# Patient Record
Sex: FEMALE | Race: OTHER | HISPANIC OR LATINO | URBAN - METROPOLITAN AREA
[De-identification: names, ages, dates, MRNs, and addresses within clinical notes are randomized per-mention and may not be internally consistent; named-entity substitution may affect disease eponyms.]

---

## 2023-02-12 ENCOUNTER — EMERGENCY (EMERGENCY)
Facility: HOSPITAL | Age: 33
LOS: 1 days | Discharge: HOME | End: 2023-02-12
Attending: EMERGENCY MEDICINE
Payer: MEDICAID

## 2023-02-12 VITALS
TEMPERATURE: 99 F | HEART RATE: 96 BPM | SYSTOLIC BLOOD PRESSURE: 125 MMHG | RESPIRATION RATE: 16 BRPM | DIASTOLIC BLOOD PRESSURE: 88 MMHG | OXYGEN SATURATION: 100 %

## 2023-02-12 VITALS
TEMPERATURE: 98 F | SYSTOLIC BLOOD PRESSURE: 128 MMHG | OXYGEN SATURATION: 100 % | HEART RATE: 137 BPM | RESPIRATION RATE: 18 BRPM | DIASTOLIC BLOOD PRESSURE: 81 MMHG | WEIGHT: 156.97 LBS

## 2023-02-12 DIAGNOSIS — Z98.891 HISTORY OF UTERINE SCAR FROM PREVIOUS SURGERY: Chronic | ICD-10-CM

## 2023-02-12 LAB
ALBUMIN SERPL ELPH-MCNC: 3.6 G/DL — SIGNIFICANT CHANGE UP (ref 3.5–5)
ALP SERPL-CCNC: 68 U/L — SIGNIFICANT CHANGE UP (ref 40–120)
ALT FLD-CCNC: 19 U/L DA — SIGNIFICANT CHANGE UP (ref 10–60)
ANION GAP SERPL CALC-SCNC: 8 MMOL/L — SIGNIFICANT CHANGE UP (ref 5–17)
APPEARANCE UR: CLEAR — SIGNIFICANT CHANGE UP
AST SERPL-CCNC: 14 U/L — SIGNIFICANT CHANGE UP (ref 10–40)
BACTERIA # UR AUTO: ABNORMAL /HPF
BASOPHILS # BLD AUTO: 0.03 K/UL — SIGNIFICANT CHANGE UP (ref 0–0.2)
BASOPHILS NFR BLD AUTO: 0.3 % — SIGNIFICANT CHANGE UP (ref 0–2)
BILIRUB SERPL-MCNC: 0.1 MG/DL — LOW (ref 0.2–1.2)
BILIRUB UR-MCNC: NEGATIVE — SIGNIFICANT CHANGE UP
BLD GP AB SCN SERPL QL: SIGNIFICANT CHANGE UP
BUN SERPL-MCNC: 8 MG/DL — SIGNIFICANT CHANGE UP (ref 7–18)
CALCIUM SERPL-MCNC: 9.5 MG/DL — SIGNIFICANT CHANGE UP (ref 8.4–10.5)
CHLORIDE SERPL-SCNC: 106 MMOL/L — SIGNIFICANT CHANGE UP (ref 96–108)
CO2 SERPL-SCNC: 22 MMOL/L — SIGNIFICANT CHANGE UP (ref 22–31)
COD CRY URNS QL: ABNORMAL /HPF
COLOR SPEC: YELLOW — SIGNIFICANT CHANGE UP
CREAT SERPL-MCNC: 0.84 MG/DL — SIGNIFICANT CHANGE UP (ref 0.5–1.3)
DIFF PNL FLD: ABNORMAL
EGFR: 95 ML/MIN/1.73M2 — SIGNIFICANT CHANGE UP
EOSINOPHIL # BLD AUTO: 0.03 K/UL — SIGNIFICANT CHANGE UP (ref 0–0.5)
EOSINOPHIL NFR BLD AUTO: 0.3 % — SIGNIFICANT CHANGE UP (ref 0–6)
EPI CELLS # UR: ABNORMAL /HPF
FLUAV AG NPH QL: SIGNIFICANT CHANGE UP
FLUBV AG NPH QL: SIGNIFICANT CHANGE UP
GLUCOSE SERPL-MCNC: 125 MG/DL — HIGH (ref 70–99)
GLUCOSE UR QL: NEGATIVE — SIGNIFICANT CHANGE UP
HCG SERPL-ACNC: HIGH MIU/ML
HCT VFR BLD CALC: 39.4 % — SIGNIFICANT CHANGE UP (ref 34.5–45)
HGB BLD-MCNC: 13.6 G/DL — SIGNIFICANT CHANGE UP (ref 11.5–15.5)
IMM GRANULOCYTES NFR BLD AUTO: 0.3 % — SIGNIFICANT CHANGE UP (ref 0–0.9)
KETONES UR-MCNC: NEGATIVE — SIGNIFICANT CHANGE UP
LEUKOCYTE ESTERASE UR-ACNC: ABNORMAL
LIDOCAIN IGE QN: 149 U/L — SIGNIFICANT CHANGE UP (ref 73–393)
LYMPHOCYTES # BLD AUTO: 1.54 K/UL — SIGNIFICANT CHANGE UP (ref 1–3.3)
LYMPHOCYTES # BLD AUTO: 14.7 % — SIGNIFICANT CHANGE UP (ref 13–44)
MCHC RBC-ENTMCNC: 28.9 PG — SIGNIFICANT CHANGE UP (ref 27–34)
MCHC RBC-ENTMCNC: 34.5 GM/DL — SIGNIFICANT CHANGE UP (ref 32–36)
MCV RBC AUTO: 83.8 FL — SIGNIFICANT CHANGE UP (ref 80–100)
MONOCYTES # BLD AUTO: 0.51 K/UL — SIGNIFICANT CHANGE UP (ref 0–0.9)
MONOCYTES NFR BLD AUTO: 4.9 % — SIGNIFICANT CHANGE UP (ref 2–14)
NEUTROPHILS # BLD AUTO: 8.35 K/UL — HIGH (ref 1.8–7.4)
NEUTROPHILS NFR BLD AUTO: 79.5 % — HIGH (ref 43–77)
NITRITE UR-MCNC: NEGATIVE — SIGNIFICANT CHANGE UP
NRBC # BLD: 0 /100 WBCS — SIGNIFICANT CHANGE UP (ref 0–0)
PH UR: 8 — SIGNIFICANT CHANGE UP (ref 5–8)
PLATELET # BLD AUTO: 261 K/UL — SIGNIFICANT CHANGE UP (ref 150–400)
POTASSIUM SERPL-MCNC: 3.3 MMOL/L — LOW (ref 3.5–5.3)
POTASSIUM SERPL-SCNC: 3.3 MMOL/L — LOW (ref 3.5–5.3)
PROT SERPL-MCNC: 8 G/DL — SIGNIFICANT CHANGE UP (ref 6–8.3)
PROT UR-MCNC: 30 MG/DL
RBC # BLD: 4.7 M/UL — SIGNIFICANT CHANGE UP (ref 3.8–5.2)
RBC # FLD: 12.6 % — SIGNIFICANT CHANGE UP (ref 10.3–14.5)
RBC CASTS # UR COMP ASSIST: SIGNIFICANT CHANGE UP /HPF (ref 0–2)
SARS-COV-2 RNA SPEC QL NAA+PROBE: SIGNIFICANT CHANGE UP
SODIUM SERPL-SCNC: 136 MMOL/L — SIGNIFICANT CHANGE UP (ref 135–145)
SP GR SPEC: 1.01 — SIGNIFICANT CHANGE UP (ref 1.01–1.02)
UROBILINOGEN FLD QL: NEGATIVE — SIGNIFICANT CHANGE UP
WBC # BLD: 10.49 K/UL — SIGNIFICANT CHANGE UP (ref 3.8–10.5)
WBC # FLD AUTO: 10.49 K/UL — SIGNIFICANT CHANGE UP (ref 3.8–10.5)
WBC UR QL: SIGNIFICANT CHANGE UP /HPF (ref 0–5)

## 2023-02-12 PROCEDURE — 76801 OB US < 14 WKS SINGLE FETUS: CPT | Mod: 26

## 2023-02-12 PROCEDURE — 86901 BLOOD TYPING SEROLOGIC RH(D): CPT

## 2023-02-12 PROCEDURE — 99285 EMERGENCY DEPT VISIT HI MDM: CPT

## 2023-02-12 PROCEDURE — 76817 TRANSVAGINAL US OBSTETRIC: CPT

## 2023-02-12 PROCEDURE — 99285 EMERGENCY DEPT VISIT HI MDM: CPT | Mod: 25

## 2023-02-12 PROCEDURE — 86850 RBC ANTIBODY SCREEN: CPT

## 2023-02-12 PROCEDURE — 80053 COMPREHEN METABOLIC PANEL: CPT

## 2023-02-12 PROCEDURE — 76801 OB US < 14 WKS SINGLE FETUS: CPT

## 2023-02-12 PROCEDURE — 93005 ELECTROCARDIOGRAM TRACING: CPT

## 2023-02-12 PROCEDURE — 83690 ASSAY OF LIPASE: CPT

## 2023-02-12 PROCEDURE — 84702 CHORIONIC GONADOTROPIN TEST: CPT

## 2023-02-12 PROCEDURE — 81001 URINALYSIS AUTO W/SCOPE: CPT

## 2023-02-12 PROCEDURE — 96360 HYDRATION IV INFUSION INIT: CPT

## 2023-02-12 PROCEDURE — 36415 COLL VENOUS BLD VENIPUNCTURE: CPT

## 2023-02-12 PROCEDURE — 76817 TRANSVAGINAL US OBSTETRIC: CPT | Mod: 26

## 2023-02-12 PROCEDURE — 86900 BLOOD TYPING SEROLOGIC ABO: CPT

## 2023-02-12 PROCEDURE — 85025 COMPLETE CBC W/AUTO DIFF WBC: CPT

## 2023-02-12 PROCEDURE — 87637 SARSCOV2&INF A&B&RSV AMP PRB: CPT

## 2023-02-12 PROCEDURE — 87086 URINE CULTURE/COLONY COUNT: CPT

## 2023-02-12 RX ORDER — POTASSIUM CHLORIDE 20 MEQ
40 PACKET (EA) ORAL ONCE
Refills: 0 | Status: COMPLETED | OUTPATIENT
Start: 2023-02-12 | End: 2023-02-12

## 2023-02-12 RX ORDER — SODIUM CHLORIDE 9 MG/ML
1000 INJECTION INTRAMUSCULAR; INTRAVENOUS; SUBCUTANEOUS ONCE
Refills: 0 | Status: COMPLETED | OUTPATIENT
Start: 2023-02-12 | End: 2023-02-12

## 2023-02-12 RX ADMIN — SODIUM CHLORIDE 1000 MILLILITER(S): 9 INJECTION INTRAMUSCULAR; INTRAVENOUS; SUBCUTANEOUS at 16:43

## 2023-02-12 RX ADMIN — SODIUM CHLORIDE 1000 MILLILITER(S): 9 INJECTION INTRAMUSCULAR; INTRAVENOUS; SUBCUTANEOUS at 17:43

## 2023-02-12 RX ADMIN — Medication 40 MILLIEQUIVALENT(S): at 20:06

## 2023-02-12 NOTE — ED PROVIDER NOTE - CLINICAL SUMMARY MEDICAL DECISION MAKING FREE TEXT BOX
Patient is pregnant and with spotting, concern for threatened .  Will get labs, type and screen, sono, reassess.  Patient claims she is very nervous because she is worried about her pregnancy.

## 2023-02-12 NOTE — ED ADULT NURSE NOTE - OBJECTIVE STATEMENT
[id number]- [id number] pregnant presents with c/o lower abdominal pain for 2 days, denies dysuria, vaginal bleeding.

## 2023-02-12 NOTE — ED PROVIDER NOTE - CARE PLAN
1 Principal Discharge DX:	Threatened   Secondary Diagnosis:	Hypokalemia  Secondary Diagnosis:	Tachycardia

## 2023-02-12 NOTE — ED PROVIDER NOTE - NSFOLLOWUPINSTRUCTIONS_ED_ALL_ED_FT
Amenaza de aborto natural    LO QUE NECESITA SABER:    ¿Qué es ric amenaza de aborto natural?Ric amenaza de aborto ocurre cuando se tiene sangrado vaginal dentro de las primeras 20 semanas de embarazo. Eso indica que podría ocurrir un aborto natural. Ric amenaza de un aborto natural también se le conoce kristopher ric amenaza de pérdida del embarazo.    ¿Qué provoca un sangrado o manchado reggie el embarazo?Es posible que se desconozca la causa del sangrado o manchado. Las siguientes son las posibles causas de un sangrado vaginal reggie el embarazo:  •Pólipos, fibromas o quistes en el útero      •Relaciones sexuales      •Infección      •Dónde o cómo está sujeta la placenta al útero (matriz)      •Un problema con el feto (bebé que no ha nacido)      •Uso de drogas o alcohol      •Embarazo ectópico (el feto se desarrolla afuera del útero)      ¿Cuáles son los signos y síntomas de ric amenaza de aborto?  •Manchado o sangrado vaginal      •Dolor o cólicos en el abdomen o área lumbar      ¿Cómo se diagnostica ric amenaza de aborto?Informe a jones médico cuando el sangrado empezó. Es posible que usted necesite alguno de los siguientes:  •Los análisis de sangrepueden mostrar ric infección, revisar jones nivel hormonal de embarazo, o proporcionar información de jones axel en general.      •Un examen pélvicopara revisar el tamaño del útero. En un examen ginecológico también revisan la dilatación de jones maura uterino.      •Ric ecografía pélvicamuestra imágenes del feto y encuentra amandeep latidos cardíacos. También la ecografía muestra amandeep órganos reproductores y mantiene en observación la cantidad de sangrado.      ¿Cuál es el control de ric amenaza de aborto?Los siguientes podrían ayudar a controlar los síntomas y disminuir jones riesgo de un aborto natural:  •No se ponga nada dentro de jones vagina.No tenga relaciones sexuales, no tome duchas vaginales ni use tampones. Estas acciones pueden aumentar jones riesgo de ric infección o de un aborto natural.      •Repose según le indicaron.No practique ningún ejercicio ni actividades vigorosas. Estas actividades pueden causar un parto prematuro o un aborto natural. Pregunte a jones médico cuáles actividades físicas son las apropiadas para usted.      ¿Cuándo diomedes buscar atención inmediata?  •Se siente débil o que se desmaya.      •Tiene dolor o cólicos en el abdomen o en la espalda que empeoran.      •Tiene sangrado vaginal que en ric hora empapa por lo menos 1 toalla sanitaria.      •Le sale un material que parece tejido o coágulos grandes.      ¿Cuándo diomedes llamar a mi médico?  •Tiene fiebre.      •Tiene problemas para orinar, siente ardor o la necesidad de orinar con frecuencia.      •Tiene sangrado vaginal nuevo o que empeora.      •Tiene dolor o comezón vaginal o flujo vaginal de color amarillo o sallie o maloliente.      •Usted tiene preguntas o inquietudes acerca de jones condición o cuidado.      ACUERDOS SOBRE JONES CUIDADO:    Usted tiene el derecho de ayudar a planear jones cuidado. Aprenda todo lo que pueda sobre jones condición y kristopher darle tratamiento. Discuta amandeep opciones de tratamiento con amandeep médicos para decidir el cuidado que usted desea recibir. Usted siempre tiene el derecho de rechazar el tratamiento.           Hipopotasemia    Hypokalemia      Hipopotasemia significa que el nivel de potasio en kenneth es cruz que lo normal. El potasio es un mineral (electrolito) que ayuda a regular la cantidad de líquido en el organismo. También estimula la tensión (contracción) muscular y ayuda a que los nervios funcionen correctamente.    Normalmente, la mayor parte del potasio del organismo se encuentra dentro de las células y solo ric cantidad muy pequeña está en la kenneth. Debido a que la cantidad en la kenneth es tan pequeña, un cambio pequeño en los niveles de potasio en la kenneth puede ser potencialmente mortal.      ¿Cuáles son las causas?    Esta afección puede ser causada por lo siguiente:  •Antibióticos.      •Diarrea o vómitos. Jagdish demasiada cantidad de un medicamento que lo ayuda a tener deposiciones (laxante) puede causar diarrea y derivar en hipopotasemia.      •Enfermedad renal crónica (ERC).      •Medicamentos que ayudan al cuerpo a eliminar el exceso de líquido (diuréticos).      •Trastornos de la alimentación, kristopher anorexia o bulimia.      •Niveles bajos de magnesio en el organismo.      •Sudoración abundante.        ¿Cuáles son los signos o síntomas?    Los síntomas de esta afección incluyen:  •Debilidad.      •Estreñimiento.      •Fatiga.      •Calambres musculares.      •Confusión mental.      •Latidos cardíacos salteados o irregulares (palpitaciones).      •Hormigueo o entumecimiento.        ¿Cómo se diagnostica?    Esta afección se diagnostica con un análisis de kenneth.      ¿Cómo se trata?    El tratamiento para esta afección puede incluir lo siguiente:  •Consumo de suplementos de potasio.      •Ajuste de los medicamentos que catracho.      •Consumo de más alimentos que contengan ric gran cantidad de potasio.      Si amandeep niveles de potasio son muy bajos, posiblemente sea necesario que reciba potasio a través de ric vía intravenosa y se lo controle en el hospital.      Siga estas indicaciones en jones casa:      Comida y bebida   A plate with examples of foods in a healthy diet.   •Siga ric dieta saludable. Ric dieta saludable incluye frutas y verduras frescas, cereales integrales, grasas saludables y proteínas magras.    •Si se lo indican, coma más alimentos que contengan ric gran cantidad de potasio. Estos incluyen:  •Yasmany secos, kristopher cacahuetes y pistachos.      •Semillas, kristopher semillas de girasol y de calabaza.      •Porotos, guisantes secos y lentejas.      •Granos enteros y panes y cereales con salvado.      •Frutas y verduras frescas, kristopher damascos, palta, bananas, melón, kiwi, naranjas, tomates, espárragos y maddie.      •Jugos, kristopher naranja, tomate y ciruelas.      •Janay magras, incluido el pescado.      •Leche y productos lácteos, kristopher yogur.        Indicaciones generales     •Use los medicamentos de venta angela y los recetados solamente kristopher se lo haya indicado el médico. Dora incluye vitaminas, productos alimenticios naturales y suplementos.      •Concurra a todas las visitas de seguimiento. Dora es importante.        Comuníquese con un médico si:    •Tiene debilidad que empeora.      •Siente que el corazón late jeremy o está acelerado.      •Vomita.      •Tiene diarrea.      •Tiene diabetes y tiene problemas para mantener el nivel de azúcar en la kenneth dentro del rango indicado.        Solicite ayuda de inmediato si:    •Siente dolor en el pecho.      •Le falta el aire.      •Tiene vómitos o diarrea que crain más de 2 días.      •Se desmaya.      Estos síntomas pueden indicar ric emergencia. Solicite ayuda de inmediato. Llame al 911.   • No espere a sanjeev si los síntomas desaparecen.       • No conduzca por amandeep propios medios hasta el hospital.         Resumen    •Hipopotasemia significa que el nivel de potasio en kenneth es cruz que lo normal.      •Esta afección se diagnostica con un análisis de kenneth.      •La hipopotasemia puede tratarse tomando suplementos de potasio, ajustando los medicamentos que catracho o comiendo más alimentos con alto contenido de potasio.      •Si amandeep niveles de potasio son muy bajos, posiblemente sea necesario que reciba potasio a través de ric vía intravenosa y se lo controle en el hospital.      Esta información no tiene kristopher fin reemplazar el consejo del médico. Asegúrese de hacerle al médico cualquier pregunta que tenga.      No douching, intercourse till further instruction  follow up with your gyn

## 2023-02-12 NOTE — ED PROVIDER NOTE - OBJECTIVE STATEMENT
[id number] Caodaism, 32-year-old female -0-0-1, LMP 22.  Patient C/O on and off lower pelvic pain since yesterday radiated to her lower back.  Patient with spotting, did not change any pads, only noted blood when she cleans herself.  Denies dysuria, sexual intercourse prior to bleeding.  Describes pain 5/10, took nothing for pain.  Patient admits to vomiting daily

## 2023-02-12 NOTE — ED PROVIDER NOTE - PATIENT PORTAL LINK FT
You can access the FollowMyHealth Patient Portal offered by [facility] by registering at the following website: [email or web address]. By joining [employer]’s FollowMyHealth portal, you will also be able to view your health information using other applications (apps) compatible with our system.

## 2023-02-12 NOTE — ED PROVIDER NOTE - PROGRESS NOTE DETAILS
pt's labs/sono reports given/explained to pt.  Pt with IUP.  Advised to f/u with OB.  Pt states her HR is always elevated when she comes to hospital.  Pt did not sleep last night worrying after "her baby".  Advised to f/u with gyn

## 2023-02-13 LAB
CULTURE RESULTS: SIGNIFICANT CHANGE UP
SPECIMEN SOURCE: SIGNIFICANT CHANGE UP

## 2023-06-07 ENCOUNTER — OUTPATIENT (OUTPATIENT)
Dept: OUTPATIENT SERVICES | Facility: HOSPITAL | Age: 33
LOS: 1 days | End: 2023-06-07
Payer: MEDICAID

## 2023-06-07 DIAGNOSIS — Z98.891 HISTORY OF UTERINE SCAR FROM PREVIOUS SURGERY: Chronic | ICD-10-CM

## 2023-06-07 DIAGNOSIS — Z3A.00 WEEKS OF GESTATION OF PREGNANCY NOT SPECIFIED: ICD-10-CM

## 2023-06-07 DIAGNOSIS — O26.899 OTHER SPECIFIED PREGNANCY RELATED CONDITIONS, UNSPECIFIED TRIMESTER: ICD-10-CM

## 2023-06-07 PROBLEM — Z78.9 OTHER SPECIFIED HEALTH STATUS: Chronic | Status: ACTIVE | Noted: 2023-02-12

## 2023-06-07 LAB
APPEARANCE UR: CLEAR — SIGNIFICANT CHANGE UP
BILIRUB UR-MCNC: NEGATIVE — SIGNIFICANT CHANGE UP
COLOR SPEC: YELLOW — SIGNIFICANT CHANGE UP
DIFF PNL FLD: ABNORMAL
GLUCOSE UR QL: NEGATIVE — SIGNIFICANT CHANGE UP
KETONES UR-MCNC: NEGATIVE — SIGNIFICANT CHANGE UP
LEUKOCYTE ESTERASE UR-ACNC: ABNORMAL
NITRITE UR-MCNC: NEGATIVE — SIGNIFICANT CHANGE UP
PH UR: 7 — SIGNIFICANT CHANGE UP (ref 5–8)
PROT UR-MCNC: 15 MG/DL
SP GR SPEC: 1.01 — SIGNIFICANT CHANGE UP (ref 1.01–1.02)
UROBILINOGEN FLD QL: NEGATIVE — SIGNIFICANT CHANGE UP

## 2023-06-07 PROCEDURE — 76819 FETAL BIOPHYS PROFIL W/O NST: CPT

## 2023-06-07 PROCEDURE — G0463: CPT

## 2023-06-07 PROCEDURE — 87077 CULTURE AEROBIC IDENTIFY: CPT

## 2023-06-07 PROCEDURE — 87070 CULTURE OTHR SPECIMN AEROBIC: CPT

## 2023-06-07 PROCEDURE — 76817 TRANSVAGINAL US OBSTETRIC: CPT

## 2023-06-07 PROCEDURE — 76817 TRANSVAGINAL US OBSTETRIC: CPT | Mod: 26

## 2023-06-07 PROCEDURE — 81001 URINALYSIS AUTO W/SCOPE: CPT

## 2023-06-07 PROCEDURE — 76819 FETAL BIOPHYS PROFIL W/O NST: CPT | Mod: 26

## 2023-06-07 PROCEDURE — 59025 FETAL NON-STRESS TEST: CPT

## 2023-06-09 LAB
CULTURE RESULTS: SIGNIFICANT CHANGE UP
SPECIMEN SOURCE: SIGNIFICANT CHANGE UP

## 2023-08-08 ENCOUNTER — OUTPATIENT (OUTPATIENT)
Dept: OUTPATIENT SERVICES | Facility: HOSPITAL | Age: 33
LOS: 1 days | End: 2023-08-08

## 2023-08-08 DIAGNOSIS — Z3A.36 36 WEEKS GESTATION OF PREGNANCY: ICD-10-CM

## 2023-08-08 DIAGNOSIS — Z01.818 ENCOUNTER FOR OTHER PREPROCEDURAL EXAMINATION: ICD-10-CM

## 2023-08-08 DIAGNOSIS — Z98.891 HISTORY OF UTERINE SCAR FROM PREVIOUS SURGERY: Chronic | ICD-10-CM

## 2023-08-08 LAB
ANION GAP SERPL CALC-SCNC: 8 MMOL/L — SIGNIFICANT CHANGE UP (ref 5–17)
APTT BLD: 27.7 SEC — SIGNIFICANT CHANGE UP (ref 24.5–35.6)
BLD GP AB SCN SERPL QL: SIGNIFICANT CHANGE UP
BUN SERPL-MCNC: 4 MG/DL — LOW (ref 7–18)
CALCIUM SERPL-MCNC: 8.7 MG/DL — SIGNIFICANT CHANGE UP (ref 8.4–10.5)
CHLORIDE SERPL-SCNC: 112 MMOL/L — HIGH (ref 96–108)
CO2 SERPL-SCNC: 19 MMOL/L — LOW (ref 22–31)
CREAT SERPL-MCNC: 0.53 MG/DL — SIGNIFICANT CHANGE UP (ref 0.5–1.3)
EGFR: 126 ML/MIN/1.73M2 — SIGNIFICANT CHANGE UP
GLUCOSE SERPL-MCNC: 104 MG/DL — HIGH (ref 70–99)
HCT VFR BLD CALC: 35.2 % — SIGNIFICANT CHANGE UP (ref 34.5–45)
HGB BLD-MCNC: 11.8 G/DL — SIGNIFICANT CHANGE UP (ref 11.5–15.5)
INR BLD: 1.14 RATIO — SIGNIFICANT CHANGE UP (ref 0.85–1.18)
MCHC RBC-ENTMCNC: 29.4 PG — SIGNIFICANT CHANGE UP (ref 27–34)
MCHC RBC-ENTMCNC: 33.5 GM/DL — SIGNIFICANT CHANGE UP (ref 32–36)
MCV RBC AUTO: 87.6 FL — SIGNIFICANT CHANGE UP (ref 80–100)
NRBC # BLD: 0 /100 WBCS — SIGNIFICANT CHANGE UP (ref 0–0)
PLATELET # BLD AUTO: 171 K/UL — SIGNIFICANT CHANGE UP (ref 150–400)
POTASSIUM SERPL-MCNC: 3.4 MMOL/L — LOW (ref 3.5–5.3)
POTASSIUM SERPL-SCNC: 3.4 MMOL/L — LOW (ref 3.5–5.3)
PROTHROM AB SERPL-ACNC: 12.9 SEC — SIGNIFICANT CHANGE UP (ref 9.5–13)
RBC # BLD: 4.02 M/UL — SIGNIFICANT CHANGE UP (ref 3.8–5.2)
RBC # FLD: 14 % — SIGNIFICANT CHANGE UP (ref 10.3–14.5)
SODIUM SERPL-SCNC: 139 MMOL/L — SIGNIFICANT CHANGE UP (ref 135–145)
WBC # BLD: 9.6 K/UL — SIGNIFICANT CHANGE UP (ref 3.8–10.5)
WBC # FLD AUTO: 9.6 K/UL — SIGNIFICANT CHANGE UP (ref 3.8–10.5)

## 2023-08-09 LAB — T PALLIDUM AB TITR SER: NEGATIVE — SIGNIFICANT CHANGE UP

## 2023-08-11 ENCOUNTER — INPATIENT (INPATIENT)
Facility: HOSPITAL | Age: 33
LOS: 2 days | Discharge: HOME | End: 2023-08-14
Attending: OBSTETRICS & GYNECOLOGY | Admitting: OBSTETRICS & GYNECOLOGY
Payer: MEDICAID

## 2023-08-11 VITALS — WEIGHT: 186.07 LBS | HEIGHT: 62 IN

## 2023-08-11 DIAGNOSIS — Z3A.36 36 WEEKS GESTATION OF PREGNANCY: ICD-10-CM

## 2023-08-11 DIAGNOSIS — Z98.891 HISTORY OF UTERINE SCAR FROM PREVIOUS SURGERY: Chronic | ICD-10-CM

## 2023-08-11 LAB
BLD GP AB SCN SERPL QL: SIGNIFICANT CHANGE UP
HIV 1 & 2 AB SERPL IA.RAPID: SIGNIFICANT CHANGE UP
HIV 1+2 AB+HIV1 P24 AG SERPL QL IA: SIGNIFICANT CHANGE UP
T PALLIDUM AB TITR SER: NEGATIVE — SIGNIFICANT CHANGE UP

## 2023-08-11 PROCEDURE — 88302 TISSUE EXAM BY PATHOLOGIST: CPT | Mod: 26

## 2023-08-11 RX ORDER — ONDANSETRON 8 MG/1
4 TABLET, FILM COATED ORAL ONCE
Refills: 0 | Status: DISCONTINUED | OUTPATIENT
Start: 2023-08-11 | End: 2023-08-14

## 2023-08-11 RX ORDER — FOLIC ACID 0.8 MG
1 TABLET ORAL DAILY
Refills: 0 | Status: DISCONTINUED | OUTPATIENT
Start: 2023-08-11 | End: 2023-08-14

## 2023-08-11 RX ORDER — ACETAMINOPHEN 500 MG
975 TABLET ORAL
Refills: 0 | Status: DISCONTINUED | OUTPATIENT
Start: 2023-08-11 | End: 2023-08-14

## 2023-08-11 RX ORDER — TETANUS TOXOID, REDUCED DIPHTHERIA TOXOID AND ACELLULAR PERTUSSIS VACCINE, ADSORBED 5; 2.5; 8; 8; 2.5 [IU]/.5ML; [IU]/.5ML; UG/.5ML; UG/.5ML; UG/.5ML
0.5 SUSPENSION INTRAMUSCULAR ONCE
Refills: 0 | Status: DISCONTINUED | OUTPATIENT
Start: 2023-08-11 | End: 2023-08-14

## 2023-08-11 RX ORDER — CITRIC ACID/SODIUM CITRATE 300-500 MG
30 SOLUTION, ORAL ORAL ONCE
Refills: 0 | Status: COMPLETED | OUTPATIENT
Start: 2023-08-11 | End: 2023-08-11

## 2023-08-11 RX ORDER — KETOROLAC TROMETHAMINE 30 MG/ML
30 SYRINGE (ML) INJECTION EVERY 6 HOURS
Refills: 0 | Status: DISCONTINUED | OUTPATIENT
Start: 2023-08-11 | End: 2023-08-13

## 2023-08-11 RX ORDER — SODIUM CHLORIDE 9 MG/ML
1000 INJECTION, SOLUTION INTRAVENOUS ONCE
Refills: 0 | Status: COMPLETED | OUTPATIENT
Start: 2023-08-11 | End: 2023-08-11

## 2023-08-11 RX ORDER — SIMETHICONE 80 MG/1
80 TABLET, CHEWABLE ORAL EVERY 4 HOURS
Refills: 0 | Status: DISCONTINUED | OUTPATIENT
Start: 2023-08-11 | End: 2023-08-14

## 2023-08-11 RX ORDER — SODIUM CHLORIDE 9 MG/ML
1000 INJECTION, SOLUTION INTRAVENOUS
Refills: 0 | Status: DISCONTINUED | OUTPATIENT
Start: 2023-08-11 | End: 2023-08-12

## 2023-08-11 RX ORDER — DIPHENHYDRAMINE HCL 50 MG
25 CAPSULE ORAL EVERY 6 HOURS
Refills: 0 | Status: DISCONTINUED | OUTPATIENT
Start: 2023-08-11 | End: 2023-08-14

## 2023-08-11 RX ORDER — OXYTOCIN 10 UNIT/ML
333.33 VIAL (ML) INJECTION
Qty: 20 | Refills: 0 | Status: DISCONTINUED | OUTPATIENT
Start: 2023-08-11 | End: 2023-08-12

## 2023-08-11 RX ORDER — MAGNESIUM HYDROXIDE 400 MG/1
30 TABLET, CHEWABLE ORAL
Refills: 0 | Status: DISCONTINUED | OUTPATIENT
Start: 2023-08-11 | End: 2023-08-14

## 2023-08-11 RX ORDER — FAMOTIDINE 10 MG/ML
20 INJECTION INTRAVENOUS ONCE
Refills: 0 | Status: COMPLETED | OUTPATIENT
Start: 2023-08-11 | End: 2023-08-11

## 2023-08-11 RX ORDER — LANOLIN
1 OINTMENT (GRAM) TOPICAL EVERY 6 HOURS
Refills: 0 | Status: DISCONTINUED | OUTPATIENT
Start: 2023-08-11 | End: 2023-08-14

## 2023-08-11 RX ORDER — FERROUS SULFATE 325(65) MG
325 TABLET ORAL DAILY
Refills: 0 | Status: DISCONTINUED | OUTPATIENT
Start: 2023-08-11 | End: 2023-08-14

## 2023-08-11 RX ORDER — IBUPROFEN 200 MG
600 TABLET ORAL EVERY 6 HOURS
Refills: 0 | Status: COMPLETED | OUTPATIENT
Start: 2023-08-11 | End: 2024-07-09

## 2023-08-11 RX ORDER — ACETAMINOPHEN 500 MG
1000 TABLET ORAL ONCE
Refills: 0 | Status: COMPLETED | OUTPATIENT
Start: 2023-08-11 | End: 2023-08-11

## 2023-08-11 RX ORDER — OXYCODONE HYDROCHLORIDE 5 MG/1
5 TABLET ORAL
Refills: 0 | Status: DISCONTINUED | OUTPATIENT
Start: 2023-08-11 | End: 2023-08-14

## 2023-08-11 RX ORDER — SODIUM CHLORIDE 9 MG/ML
1000 INJECTION, SOLUTION INTRAVENOUS
Refills: 0 | Status: DISCONTINUED | OUTPATIENT
Start: 2023-08-11 | End: 2023-08-11

## 2023-08-11 RX ORDER — HEPARIN SODIUM 5000 [USP'U]/ML
5000 INJECTION INTRAVENOUS; SUBCUTANEOUS EVERY 12 HOURS
Refills: 0 | Status: DISCONTINUED | OUTPATIENT
Start: 2023-08-12 | End: 2023-08-14

## 2023-08-11 RX ORDER — CEFAZOLIN SODIUM 1 G
2000 VIAL (EA) INJECTION ONCE
Refills: 0 | Status: COMPLETED | OUTPATIENT
Start: 2023-08-11 | End: 2023-08-11

## 2023-08-11 RX ORDER — METOCLOPRAMIDE HCL 10 MG
10 TABLET ORAL ONCE
Refills: 0 | Status: COMPLETED | OUTPATIENT
Start: 2023-08-11 | End: 2023-08-11

## 2023-08-11 RX ADMIN — Medication 100 MILLIGRAM(S): at 14:49

## 2023-08-11 RX ADMIN — Medication 400 MILLIGRAM(S): at 21:15

## 2023-08-11 RX ADMIN — Medication 10 MILLIGRAM(S): at 21:15

## 2023-08-11 RX ADMIN — SODIUM CHLORIDE 125 MILLILITER(S): 9 INJECTION, SOLUTION INTRAVENOUS at 14:30

## 2023-08-11 RX ADMIN — FAMOTIDINE 20 MILLIGRAM(S): 10 INJECTION INTRAVENOUS at 14:50

## 2023-08-11 RX ADMIN — Medication 1000 MILLIUNIT(S)/MIN: at 16:04

## 2023-08-11 RX ADMIN — SIMETHICONE 80 MILLIGRAM(S): 80 TABLET, CHEWABLE ORAL at 22:07

## 2023-08-11 RX ADMIN — SODIUM CHLORIDE 2000 MILLILITER(S): 9 INJECTION, SOLUTION INTRAVENOUS at 13:15

## 2023-08-11 RX ADMIN — Medication 30 MILLILITER(S): at 14:46

## 2023-08-11 RX ADMIN — Medication 1000 MILLIGRAM(S): at 21:45

## 2023-08-11 RX ADMIN — SODIUM CHLORIDE 125 MILLILITER(S): 9 INJECTION, SOLUTION INTRAVENOUS at 19:00

## 2023-08-11 NOTE — CHART NOTE - NSCHARTNOTEFT_GEN_A_CORE
OB PA Postop Note    Patient seen at bedside, resting comfortably offers no new complaints. Victor in place. Baby in nursery. Denies HA, CP, SOB, N/V/D, dizziness, palpitations, worsening abdominal pain, worsening vaginal bleeding, or any other concerns.      Vital Signs Last 24 Hrs  T(C): 36.7 (11 Aug 2023 20:50), Max: 36.8 (11 Aug 2023 17:20)  T(F): 98 (11 Aug 2023 20:50), Max: 98.2 (11 Aug 2023 17:20)  HR: 106 (11 Aug 2023 20:50) (106 - 119)  BP: 116/74 (11 Aug 2023 20:50) (92/80 - 117/63)  BP(mean): 84 (11 Aug 2023 19:05) (74 - 87)  RR: 18 (11 Aug 2023 20:50) (16 - 18)  SpO2: 98% (11 Aug 2023 20:50) (98% - 100%)    Parameters below as of 11 Aug 2023 20:50  Patient On (Oxygen Delivery Method): room air        Gen: A&O x 3, NAD  Chest: CTA B/L  Cardiac: S1, S2  RRR  Abdomen: +BS; soft; NT; ND; Dressing C/D/I, fundus firm  Gyn: Minimal lochia, victor  Ext: Nontender, no worsening edema, venodynes         A/P: POD #0 s/p rpt c/s 36 weeks 3 days & BTL classical incision, stable      -heparin dvt prophylaxis  -Pain management, postop care  -OOB and ambulate, incentive spirometry  -Advance diet with flatus  -Encourage breastfeeding   -d/w Dr. Zhou [id number] [facility]

## 2023-08-11 NOTE — PATIENT PROFILE OB - CURRENT PREGNANCY COMPLICATIONS, OB PROFILE
classical incision/Maternal Unknown GBS/Other previous classical incision/Maternal Unknown GBS/Other

## 2023-08-11 NOTE — PATIENT PROFILE OB - FALL HARM RISK - UNIVERSAL INTERVENTIONS
yes Bed in lowest position, wheels locked, appropriate side rails in place/Call bell, personal items and telephone in reach/Instruct patient to call for assistance before getting out of bed or chair/Non-slip footwear when patient is out of bed/[place] to call system/Physically safe environment - no spills, clutter or unnecessary equipment/Purposeful Proactive Rounding/Room/bathroom lighting operational, light cord in reach

## 2023-08-12 DIAGNOSIS — Z64.1 PROBLEMS RELATED TO MULTIPARITY: ICD-10-CM

## 2023-08-12 LAB
BASOPHILS # BLD AUTO: 0.03 K/UL — SIGNIFICANT CHANGE UP (ref 0–0.2)
BASOPHILS NFR BLD AUTO: 0.3 % — SIGNIFICANT CHANGE UP (ref 0–2)
EOSINOPHIL # BLD AUTO: 0.02 K/UL — SIGNIFICANT CHANGE UP (ref 0–0.5)
EOSINOPHIL NFR BLD AUTO: 0.2 % — SIGNIFICANT CHANGE UP (ref 0–6)
HCT VFR BLD CALC: 35.2 % — SIGNIFICANT CHANGE UP (ref 34.5–45)
HGB BLD-MCNC: 12 G/DL — SIGNIFICANT CHANGE UP (ref 11.5–15.5)
IMM GRANULOCYTES NFR BLD AUTO: 0.5 % — SIGNIFICANT CHANGE UP (ref 0–0.9)
LYMPHOCYTES # BLD AUTO: 1.91 K/UL — SIGNIFICANT CHANGE UP (ref 1–3.3)
LYMPHOCYTES # BLD AUTO: 17.2 % — SIGNIFICANT CHANGE UP (ref 13–44)
MCHC RBC-ENTMCNC: 29.6 PG — SIGNIFICANT CHANGE UP (ref 27–34)
MCHC RBC-ENTMCNC: 34.1 GM/DL — SIGNIFICANT CHANGE UP (ref 32–36)
MCV RBC AUTO: 86.9 FL — SIGNIFICANT CHANGE UP (ref 80–100)
MONOCYTES # BLD AUTO: 0.91 K/UL — HIGH (ref 0–0.9)
MONOCYTES NFR BLD AUTO: 8.2 % — SIGNIFICANT CHANGE UP (ref 2–14)
NEUTROPHILS # BLD AUTO: 8.17 K/UL — HIGH (ref 1.8–7.4)
NEUTROPHILS NFR BLD AUTO: 73.6 % — SIGNIFICANT CHANGE UP (ref 43–77)
NRBC # BLD: 0 /100 WBCS — SIGNIFICANT CHANGE UP (ref 0–0)
PLATELET # BLD AUTO: 165 K/UL — SIGNIFICANT CHANGE UP (ref 150–400)
RBC # BLD: 4.05 M/UL — SIGNIFICANT CHANGE UP (ref 3.8–5.2)
RBC # FLD: 13.5 % — SIGNIFICANT CHANGE UP (ref 10.3–14.5)
WBC # BLD: 11.1 K/UL — HIGH (ref 3.8–10.5)
WBC # FLD AUTO: 11.1 K/UL — HIGH (ref 3.8–10.5)

## 2023-08-12 RX ORDER — IBUPROFEN 200 MG
1 TABLET ORAL
Qty: 60 | Refills: 1
Start: 2023-08-12 | End: 2023-09-10

## 2023-08-12 RX ORDER — SIMETHICONE 80 MG/1
1 TABLET, CHEWABLE ORAL
Qty: 90 | Refills: 0
Start: 2023-08-12 | End: 2023-08-26

## 2023-08-12 RX ORDER — FAMOTIDINE 10 MG/ML
1 INJECTION INTRAVENOUS
Qty: 20 | Refills: 0
Start: 2023-08-12 | End: 2023-08-21

## 2023-08-12 RX ORDER — SENNA PLUS 8.6 MG/1
2 TABLET ORAL DAILY
Refills: 0 | Status: DISCONTINUED | OUTPATIENT
Start: 2023-08-12 | End: 2023-08-14

## 2023-08-12 RX ORDER — FAMOTIDINE 10 MG/ML
20 INJECTION INTRAVENOUS
Refills: 0 | Status: DISCONTINUED | OUTPATIENT
Start: 2023-08-12 | End: 2023-08-14

## 2023-08-12 RX ORDER — ACETAMINOPHEN 500 MG
2 TABLET ORAL
Qty: 120 | Refills: 1
Start: 2023-08-12 | End: 2023-09-10

## 2023-08-12 RX ORDER — SENNOSIDES/DOCUSATE SODIUM 8.6MG-50MG
1 TABLET ORAL
Qty: 60 | Refills: 0
Start: 2023-08-12 | End: 2023-09-10

## 2023-08-12 RX ADMIN — Medication 30 MILLIGRAM(S): at 00:00

## 2023-08-12 RX ADMIN — SIMETHICONE 80 MILLIGRAM(S): 80 TABLET, CHEWABLE ORAL at 05:06

## 2023-08-12 RX ADMIN — Medication 975 MILLIGRAM(S): at 09:18

## 2023-08-12 RX ADMIN — Medication 975 MILLIGRAM(S): at 04:27

## 2023-08-12 RX ADMIN — Medication 30 MILLIGRAM(S): at 06:20

## 2023-08-12 RX ADMIN — SIMETHICONE 80 MILLIGRAM(S): 80 TABLET, CHEWABLE ORAL at 18:05

## 2023-08-12 RX ADMIN — HEPARIN SODIUM 5000 UNIT(S): 5000 INJECTION INTRAVENOUS; SUBCUTANEOUS at 18:06

## 2023-08-12 RX ADMIN — Medication 30 MILLIGRAM(S): at 06:05

## 2023-08-12 RX ADMIN — Medication 30 MILLIGRAM(S): at 00:15

## 2023-08-12 RX ADMIN — Medication 30 MILLIGRAM(S): at 12:25

## 2023-08-12 RX ADMIN — Medication 30 MILLIGRAM(S): at 18:06

## 2023-08-12 RX ADMIN — SENNA PLUS 2 TABLET(S): 8.6 TABLET ORAL at 15:42

## 2023-08-12 RX ADMIN — FAMOTIDINE 20 MILLIGRAM(S): 10 INJECTION INTRAVENOUS at 18:06

## 2023-08-12 RX ADMIN — Medication 1 TABLET(S): at 11:50

## 2023-08-12 RX ADMIN — SIMETHICONE 80 MILLIGRAM(S): 80 TABLET, CHEWABLE ORAL at 13:21

## 2023-08-12 RX ADMIN — Medication 975 MILLIGRAM(S): at 03:27

## 2023-08-12 RX ADMIN — Medication 30 MILLIGRAM(S): at 11:50

## 2023-08-12 RX ADMIN — Medication 975 MILLIGRAM(S): at 22:06

## 2023-08-12 RX ADMIN — Medication 1 MILLIGRAM(S): at 11:50

## 2023-08-12 RX ADMIN — SODIUM CHLORIDE 125 MILLILITER(S): 9 INJECTION, SOLUTION INTRAVENOUS at 03:00

## 2023-08-12 RX ADMIN — SIMETHICONE 80 MILLIGRAM(S): 80 TABLET, CHEWABLE ORAL at 09:18

## 2023-08-12 RX ADMIN — Medication 975 MILLIGRAM(S): at 15:42

## 2023-08-12 RX ADMIN — HEPARIN SODIUM 5000 UNIT(S): 5000 INJECTION INTRAVENOUS; SUBCUTANEOUS at 05:00

## 2023-08-12 RX ADMIN — Medication 975 MILLIGRAM(S): at 16:30

## 2023-08-12 RX ADMIN — SIMETHICONE 80 MILLIGRAM(S): 80 TABLET, CHEWABLE ORAL at 21:05

## 2023-08-12 RX ADMIN — Medication 30 MILLIGRAM(S): at 18:40

## 2023-08-12 RX ADMIN — Medication 975 MILLIGRAM(S): at 10:00

## 2023-08-12 RX ADMIN — Medication 975 MILLIGRAM(S): at 21:06

## 2023-08-12 NOTE — PROGRESS NOTE ADULT - PROBLEM SELECTOR PROBLEM 3
[facility] Ryley Lazaro)  Plastic Surgery  [address] [place] [address]  Phone: [phone]  Fax: [phone]  Follow Up Time:

## 2023-08-13 DIAGNOSIS — Z98.51 TUBAL LIGATION STATUS: ICD-10-CM

## 2023-08-13 LAB
BASOPHILS # BLD AUTO: 0.03 K/UL — SIGNIFICANT CHANGE UP (ref 0–0.2)
BASOPHILS NFR BLD AUTO: 0.3 % — SIGNIFICANT CHANGE UP (ref 0–2)
EOSINOPHIL # BLD AUTO: 0.07 K/UL — SIGNIFICANT CHANGE UP (ref 0–0.5)
EOSINOPHIL NFR BLD AUTO: 0.8 % — SIGNIFICANT CHANGE UP (ref 0–6)
HCT VFR BLD CALC: 32.9 % — LOW (ref 34.5–45)
HGB BLD-MCNC: 11.1 G/DL — LOW (ref 11.5–15.5)
IMM GRANULOCYTES NFR BLD AUTO: 0.7 % — SIGNIFICANT CHANGE UP (ref 0–0.9)
LYMPHOCYTES # BLD AUTO: 2.31 K/UL — SIGNIFICANT CHANGE UP (ref 1–3.3)
LYMPHOCYTES # BLD AUTO: 25.8 % — SIGNIFICANT CHANGE UP (ref 13–44)
MCHC RBC-ENTMCNC: 29.6 PG — SIGNIFICANT CHANGE UP (ref 27–34)
MCHC RBC-ENTMCNC: 33.7 GM/DL — SIGNIFICANT CHANGE UP (ref 32–36)
MCV RBC AUTO: 87.7 FL — SIGNIFICANT CHANGE UP (ref 80–100)
MONOCYTES # BLD AUTO: 0.81 K/UL — SIGNIFICANT CHANGE UP (ref 0–0.9)
MONOCYTES NFR BLD AUTO: 9 % — SIGNIFICANT CHANGE UP (ref 2–14)
NEUTROPHILS # BLD AUTO: 5.69 K/UL — SIGNIFICANT CHANGE UP (ref 1.8–7.4)
NEUTROPHILS NFR BLD AUTO: 63.4 % — SIGNIFICANT CHANGE UP (ref 43–77)
NRBC # BLD: 0 /100 WBCS — SIGNIFICANT CHANGE UP (ref 0–0)
PLATELET # BLD AUTO: 172 K/UL — SIGNIFICANT CHANGE UP (ref 150–400)
RBC # BLD: 3.75 M/UL — LOW (ref 3.8–5.2)
RBC # FLD: 14.1 % — SIGNIFICANT CHANGE UP (ref 10.3–14.5)
WBC # BLD: 8.97 K/UL — SIGNIFICANT CHANGE UP (ref 3.8–10.5)
WBC # FLD AUTO: 8.97 K/UL — SIGNIFICANT CHANGE UP (ref 3.8–10.5)

## 2023-08-13 RX ORDER — IBUPROFEN 200 MG
600 TABLET ORAL EVERY 6 HOURS
Refills: 0 | Status: DISCONTINUED | OUTPATIENT
Start: 2023-08-13 | End: 2023-08-14

## 2023-08-13 RX ADMIN — Medication 975 MILLIGRAM(S): at 16:57

## 2023-08-13 RX ADMIN — Medication 600 MILLIGRAM(S): at 18:14

## 2023-08-13 RX ADMIN — SIMETHICONE 80 MILLIGRAM(S): 80 TABLET, CHEWABLE ORAL at 12:21

## 2023-08-13 RX ADMIN — Medication 975 MILLIGRAM(S): at 09:20

## 2023-08-13 RX ADMIN — SIMETHICONE 80 MILLIGRAM(S): 80 TABLET, CHEWABLE ORAL at 21:16

## 2023-08-13 RX ADMIN — Medication 600 MILLIGRAM(S): at 17:40

## 2023-08-13 RX ADMIN — Medication 600 MILLIGRAM(S): at 12:55

## 2023-08-13 RX ADMIN — FAMOTIDINE 20 MILLIGRAM(S): 10 INJECTION INTRAVENOUS at 17:40

## 2023-08-13 RX ADMIN — FAMOTIDINE 20 MILLIGRAM(S): 10 INJECTION INTRAVENOUS at 06:03

## 2023-08-13 RX ADMIN — Medication 600 MILLIGRAM(S): at 12:22

## 2023-08-13 RX ADMIN — Medication 975 MILLIGRAM(S): at 21:16

## 2023-08-13 RX ADMIN — Medication 975 MILLIGRAM(S): at 08:51

## 2023-08-13 RX ADMIN — Medication 1 TABLET(S): at 12:21

## 2023-08-13 RX ADMIN — MAGNESIUM HYDROXIDE 30 MILLILITER(S): 400 TABLET, CHEWABLE ORAL at 08:50

## 2023-08-13 RX ADMIN — Medication 30 MILLIGRAM(S): at 06:02

## 2023-08-13 RX ADMIN — HEPARIN SODIUM 5000 UNIT(S): 5000 INJECTION INTRAVENOUS; SUBCUTANEOUS at 17:40

## 2023-08-13 RX ADMIN — Medication 30 MILLIGRAM(S): at 06:17

## 2023-08-13 RX ADMIN — Medication 975 MILLIGRAM(S): at 22:16

## 2023-08-13 RX ADMIN — HEPARIN SODIUM 5000 UNIT(S): 5000 INJECTION INTRAVENOUS; SUBCUTANEOUS at 06:02

## 2023-08-13 RX ADMIN — SIMETHICONE 80 MILLIGRAM(S): 80 TABLET, CHEWABLE ORAL at 17:40

## 2023-08-13 RX ADMIN — Medication 1 MILLIGRAM(S): at 12:22

## 2023-08-13 RX ADMIN — Medication 325 MILLIGRAM(S): at 12:21

## 2023-08-13 RX ADMIN — SIMETHICONE 80 MILLIGRAM(S): 80 TABLET, CHEWABLE ORAL at 06:03

## 2023-08-13 RX ADMIN — SENNA PLUS 2 TABLET(S): 8.6 TABLET ORAL at 12:21

## 2023-08-13 RX ADMIN — Medication 30 MILLIGRAM(S): at 00:15

## 2023-08-13 RX ADMIN — Medication 30 MILLIGRAM(S): at 00:00

## 2023-08-13 RX ADMIN — Medication 600 MILLIGRAM(S): at 23:56

## 2023-08-13 RX ADMIN — Medication 975 MILLIGRAM(S): at 16:24

## 2023-08-13 NOTE — DISCHARGE NOTE OB - CARE PROVIDER_API CALL
Filemon Zhou.  Obstetrics and Gynecology  [address], [address]  [place] [address]  Phone: [phone]  Fax: [phone]  Follow Up Time: 1 week

## 2023-08-13 NOTE — DISCHARGE NOTE OB - CAREGIVER PHONE NUMBER
[phone] Secondary Intention Text (Leave Blank If You Do Not Want): The defect will heal with secondary intention.

## 2023-08-13 NOTE — DISCHARGE NOTE OB - PATIENT PORTAL LINK FT
You can access the FollowMyHealth Patient Portal offered by [facility] by registering at the following website: [email or web address]. By joining [employer]’s FollowMyHealth portal, you will also be able to view your health information using other applications (apps) compatible with our system.

## 2023-08-13 NOTE — DISCHARGE NOTE OB - MEDICATION SUMMARY - MEDICATIONS TO TAKE
I will START or STAY ON the medications listed below when I get home from the hospital:     mg oral tablet  -- 1 tab(s) by mouth every 6 hours as needed for  moderate pain  -- Indication: For Pain    acetaminophen 500 mg oral capsule  -- 2 cap(s) by mouth every 6 hours as needed for  mild pain  -- Indication: For Pain    famotidine 20 mg oral tablet  -- 1 tab(s) by mouth 2 times a day  -- Indication: For GERD    Prenatal Multivitamins with Folic Acid 1 mg oral tablet  -- 1 tab(s) by mouth once a day  -- Indication: For Supplementation    Colace 2-in-1 50 mg-8.6 mg oral tablet  -- 1 tab(s) by mouth 2 times a day  -- Indication: For Constipation    simethicone 80 mg oral tablet, chewable  -- 1 tab(s) chewed every 4 hours  -- Indication: For gas pain

## 2023-08-13 NOTE — DISCHARGE NOTE OB - NS MD DC FALL RISK RISK
For information on Fall & Injury Prevention, visit: [email or web address] OR  [email or web address] OR  https://www.cdc.gov/steadi/patient.html

## 2023-08-13 NOTE — PROGRESS NOTE ADULT - PROBLEM SELECTOR PLAN 2
-Pain management as needed  -cont post op care  -OOB and ambulate  -encourage insentive spirometer use  -Encourage breastfeeding   -d/w dr Zhou

## 2023-08-14 VITALS
HEART RATE: 61 BPM | OXYGEN SATURATION: 100 % | DIASTOLIC BLOOD PRESSURE: 77 MMHG | SYSTOLIC BLOOD PRESSURE: 122 MMHG | TEMPERATURE: 98 F | RESPIRATION RATE: 18 BRPM

## 2023-08-14 PROCEDURE — 86780 TREPONEMA PALLIDUM: CPT

## 2023-08-14 PROCEDURE — 85025 COMPLETE CBC W/AUTO DIFF WBC: CPT

## 2023-08-14 PROCEDURE — 36415 COLL VENOUS BLD VENIPUNCTURE: CPT

## 2023-08-14 PROCEDURE — 87389 HIV-1 AG W/HIV-1&-2 AB AG IA: CPT

## 2023-08-14 PROCEDURE — 86900 BLOOD TYPING SEROLOGIC ABO: CPT

## 2023-08-14 PROCEDURE — 86703 HIV-1/HIV-2 1 RESULT ANTBDY: CPT

## 2023-08-14 PROCEDURE — 86850 RBC ANTIBODY SCREEN: CPT

## 2023-08-14 PROCEDURE — 86901 BLOOD TYPING SEROLOGIC RH(D): CPT

## 2023-08-14 PROCEDURE — 88302 TISSUE EXAM BY PATHOLOGIST: CPT

## 2023-08-14 RX ADMIN — Medication 1 MILLIGRAM(S): at 12:06

## 2023-08-14 RX ADMIN — HEPARIN SODIUM 5000 UNIT(S): 5000 INJECTION INTRAVENOUS; SUBCUTANEOUS at 06:08

## 2023-08-14 RX ADMIN — Medication 600 MILLIGRAM(S): at 06:09

## 2023-08-14 RX ADMIN — SENNA PLUS 2 TABLET(S): 8.6 TABLET ORAL at 12:05

## 2023-08-14 RX ADMIN — FAMOTIDINE 20 MILLIGRAM(S): 10 INJECTION INTRAVENOUS at 06:09

## 2023-08-14 RX ADMIN — Medication 600 MILLIGRAM(S): at 07:09

## 2023-08-14 RX ADMIN — Medication 1 TABLET(S): at 12:31

## 2023-08-14 RX ADMIN — Medication 325 MILLIGRAM(S): at 12:05

## 2023-08-14 RX ADMIN — Medication 600 MILLIGRAM(S): at 12:06

## 2023-08-14 RX ADMIN — Medication 975 MILLIGRAM(S): at 11:03

## 2023-08-14 RX ADMIN — SIMETHICONE 80 MILLIGRAM(S): 80 TABLET, CHEWABLE ORAL at 11:04

## 2023-08-14 RX ADMIN — Medication 975 MILLIGRAM(S): at 11:45

## 2023-08-14 RX ADMIN — Medication 600 MILLIGRAM(S): at 12:55

## 2023-08-14 RX ADMIN — Medication 600 MILLIGRAM(S): at 00:56

## 2023-08-14 RX ADMIN — SIMETHICONE 80 MILLIGRAM(S): 80 TABLET, CHEWABLE ORAL at 06:09

## 2023-08-14 NOTE — PROGRESS NOTE ADULT - PROBLEM SELECTOR PLAN 1
A/P: 32yo admitted for scheduled case POD #3 s/p repeat c/s c/s @ 36.3weeks and btl, h/o previous classical incision, pt stable  -d/c home   -instructions verbalized  -follow up in 1-2weeks in office for incision check  -d/w Dr. Zhou
-Pain management as needed  -cont post op care  -OOB and ambulate  -encourage insentive spirometer use  -Encourage breastfeeding   -d/w dr Zhou

## 2023-08-14 NOTE — PROGRESS NOTE ADULT - SUBJECTIVE AND OBJECTIVE BOX
Patient seen at bedside resting comfortably offers no new complaints. + Ambulation, + void without difficulty, + flatus;  no bm; tolerating regular diet. both breastfeeding and bottle feeding. Denies HA, blurry vision or epigastric pain, CP, SOB, N/V/D,  dizziness, palpitations, worsening vaginal bleeding.    Vital Signs Last 24 Hrs  T(C): 36.9 (13 Aug 2023 06:00), Max: 36.9 (13 Aug 2023 06:00)  T(F): 98.4 (13 Aug 2023 06:00), Max: 98.4 (13 Aug 2023 06:00)  HR: 69 (13 Aug 2023 06:00) (69 - 96)  BP: 116/79 (13 Aug 2023 06:00) (104/69 - 116/79)  BP(mean): 91 (13 Aug 2023 06:00) (91 - 91)  RR: 16 (13 Aug 2023 06:00) (16 - 16)  SpO2: 98% (13 Aug 2023 06:00) (96% - 99%)    Parameters below as of 13 Aug 2023 06:00  Patient On (Oxygen Delivery Method): room air    Gen: A&O x 3, NAD  Chest: CTABL  Cardiac: S1, S2, RRR  Breast: Soft, nontender, nonengorged  Abdomen: +BS; soft; Nontender, nondistended, Incision C/D/I steri strips in place   Gyn: Minimal lochia  Extremities: Nontender, DTRS 2+, no worsening edema                          11.1   8.97  )-----------( 172      ( 13 Aug 2023 06:50 )             32.9         
late entry due to pt care    PA NOTE:  pod#1 s/p rpt cs and btl at 36 3/7wks   in level 2  pt doing well offers no acute complaints     Patient seen at bedside resting comfortably offers no new complaints. + Ambulation, voiding without difficulty, + flatus; tolerating regular diet. both breastfeeding and bottle feeding. Denies HA, CP, SOB, N/V/D,  no bm; dizziness, palpitations, worsening abdominal pain, worsening vaginal bleeding, or any other concerns.     Vital Signs Last 24 Hrs  T(C): 36.4 (12 Aug 2023 10:01), Max: 36.9 (12 Aug 2023 04:58)  T(F): 97.6 (12 Aug 2023 10:01), Max: 98.4 (12 Aug 2023 04:58)  HR: 76 (12 Aug 2023 10:01) (76 - 119)  BP: 99/65 (12 Aug 2023 10:01) (92/80 - 117/63)  BP(mean): 84 (11 Aug 2023 19:05) (74 - 87)  RR: 16 (12 Aug 2023 10:01) (16 - 18)  SpO2: 98% (12 Aug 2023 10:01) (98% - 100%)    Parameters below as of 12 Aug 2023 10:01  Patient On (Oxygen Delivery Method): room air        Gen: A&O x 3, NAD  Chest: CTABL  Cardiac: S1+S2+ RRR  Breast: Soft, nontender, nonengorged  Abdomen: +BS; soft; Nontender, nondistended, dressing removed Incision C/D/I steri strips in place   Gyn: Minimal lochia  Extremities: Nontender, DTRS 2+, no worsening edema                    
Patient seen at bedside resting comfortably offers no new complaints. + Ambulation, + void without difficulty, + flatus;  +bm;  tolerating regular diet. Pt both breastfeeding and bottle feeding. Pt denies headache, blurry vision or epigastric pain, chest pain, shortness of breath, N/V/D,  dizziness, palpitations, worsening vaginal bleeding.     Vital Signs Last 24 Hrs  T(C): 36.4 (14 Aug 2023 05:30), Max: 36.9 (13 Aug 2023 18:28)  T(F): 97.6 (14 Aug 2023 05:30), Max: 98.4 (13 Aug 2023 18:28)  HR: 61 (14 Aug 2023 05:30) (61 - 79)  BP: 122/77 (14 Aug 2023 05:30) (122/77 - 124/80)  BP(mean): --  RR: 18 (14 Aug 2023 05:30) (18 - 18)  SpO2: 100% (14 Aug 2023 05:30) (100% - 100%)    Parameters below as of 14 Aug 2023 05:30  Patient On (Oxygen Delivery Method): room air        Gen: A&O x 3, NAD  Chest: CTA B/L  Cardiac: S1,S2  RRR  Breast: Soft, nontender, nonengorged  Abdomen: +BS; soft; Nontender, nondistended, Incision C/D/I steri strips in place   Gyn: Minimal lochia  Extremities: Nontender, DTRS 2+, no worsening edema                          11.1   8.97  )-----------( 172      ( 13 Aug 2023 06:50 )             32.9       A/P: 32yo admitted for scheduled case POD #3 s/p repeat c/s c/s @ 36.3weeks and btl, h/o previous classical incision, pt stable  -d/c home   -instructions verbalized  -follow up in 1-2weeks in office for incision check  -d/w Dr. Zhou

## 2023-08-14 NOTE — LACTATION INITIAL EVALUATION - BREAST ASSESSMENT (LEFT)
Routing refill request to provider for review/approval because:  ACT overdue    KAITLIN CabreraN, RN  [facility]       soft

## 2023-08-14 NOTE — PROGRESS NOTE ADULT - ASSESSMENT
A/P: 34yo admitted for scheduled case POD #3 s/p repeat c/s c/s @ 36.3weeks and btl, h/o previous classical incision, pt stable  -d/c home   -instructions verbalized  -follow up in 1-2weeks in office for incision check  -d/w Dr. Zhou 
late entry due to pt care    PA NOTE:  pod#1 s/p rpt cs and btl at 36 3/7wks   in level 2  pt doing well offers no acute complaints                         12.0   11.10 )-----------( 165      ( 12 Aug 2023 06:00 )             35.2   -Pain management as needed  -cont post op care  -adv diet to regular   -OOB and ambulate  -f/u Rpt CBC in am  -encourage incentive spirometer use  -Encourage breastfeeding   -d/w dr resendiz
A/P: POD #2 s/p rpt c/s & BTL

## 2023-08-14 NOTE — LACTATION INITIAL EVALUATION - LACTATION INTERVENTIONS
Baby 36+ weeks, hx. NICU admission for resp. distress; baby now rooms in with pt. pt. breast and formula feeds; pt. scheduled for d/c home today; baby pending car seat challenge; attended mother-baby breastfeeding and d/c class this morning; pt's questions regarding basic post-c/s care and  care and feedings addressed; Education provided in [place]ian both verbally and written per pt's preference; Referral for breastpump sent via pt's insurance per pt's request ; safe formula prep/feeding inst. provided/initiate/review safe skin-to-skin/initiate/review hand expression/post discharge community resources provided/review techniques to increase milk supply/review techniques to manage sore nipples/engorgement/reviewed components of an effective feeding and at least 8 effective feedings per day required/reviewed importance of monitoring infant diapers, the breastfeeding log, and minimum output each day/reviewed benefits and recommendations for rooming in/reviewed feeding on demand/by cue at least 8 times a day/reviewed indications of inadequate milk transfer that would require supplementation
